# Patient Record
(demographics unavailable — no encounter records)

---

## 2024-11-12 NOTE — HISTORY OF PRESENT ILLNESS
[FreeTextEntry1] : HISTORY OF PRESENT ILLNESS ***Nov. 12, 2024*** New cardiologist Dr. Nettles Mr. CONNER was diagnosed with Diabetes Mellitus Type-2  7 or 8 YEARS AGO and arrives today for initial evaluation of glycemic control.   He reports history of HTN, dyslipidemia, IBS, Obesity, AFIB, vitamin D3 deficiency, Prostate CA s/p cyberknife, MI, CAD w/ stent placed 2022 known complications of retinopathy, nephropathy, or neuropathy. He is presently on metformin 500 mg 1 tab with breakfast 2 tabs with dinner  He endorses being highly motivated to reduce blood sugar lose weight to reduce renal and cardiac risk factors.   Blood sugars are not checked.   Hypoglycemia frequency: Pt denies subjective HYPOs   Fingerstick glucose in the office today is 150 mg/dL  3 hours after coffee with milk.   Diet: not following ADA,    Exercise: Denies structured exercise regimen, retired Lab review: a1c-6.2%   Last dilated eye -October 2024  Last podiatry visit  - every 2 months  Last cardiology evaluation - wears loop recorder, Dr. Nettles on december 2, 2024  Last stress test -  Last 2-D Echo -  Last nephrology evaluation -  Last neurology evaluation-

## 2024-11-12 NOTE — ASSESSMENT
[FreeTextEntry1] : T2DM, Obesity, in context of CAD Need more data at present -JUN PRO today and personal cgm moving forward as dietary change tool -Resume Ozempic 0.25mg qwk and up-titrate as directed -DC metformin for now -Dietary goal: 1 serving (15 grams) of Carbs per meal.   -Exercise: a brisk 10-minute walk 30-minutes after meals especially dinner Strength training-mild circuit training on resistance machines -We discussed the necessity that diet and exercise interventions be consistent daily quantifiable measures.    -Frequent follow ups  rtc 4 weeks    Diabetes Counseling: The patient was counseled on diabetes foot care, long term vascular complications of diabetes, carbohydrate consistent diet, importance of diet and exercise to improve glycemic control, achieve weight loss and improve cardiovascular health, exercise/effect on glucose, hypoglycemia management, glucagon use, ketone testing, action and use of short and long-acting insulin, self-monitoring of blood glucose, insulin self-administration, injection technique, storage, and sharps disposal and sick-day management.  Patient was referred to ophthalmology for retinopathy screening. Risks and benefits of diabetic medications were discussed.

## 2024-12-02 NOTE — HISTORY OF PRESENT ILLNESS
[FreeTextEntry1] : 72M with HTN, HLD, DM, pAF s/p ablation (), CAD s/p PCI to LAD (), KRISTINA on CPAP presents for initial cardiac evaluation   Here to establish care  Reports SOB when he climbs the stairs Can only walk 6-7 min before having to stop  Unchanged x years Sometimes feels a "pulsating" in his ears  Occasional chest "twinges" lasting a few seconds at a times No identifiable triggers, nonradiating, nonexertional   HISTORY:   - C/o palpitations, Afib noted on ECG. Sent to [facility] ER, underwent ablation with EP Dr. Ginny Reyez. Now with ILR.   - C/o chest tightness. Underwent cath at [facility]. S/p PCI to LAD. VF arrest post stent- s/p successful resuscitation   Former smoker, quit ~16yrs ago. Father -  of CHF, DM, CABG. Mother - HTN. Retired teacher.  ECG: SR with RBBB, [facility] 3/9/24: 80% mLAD s/p DECLAN TTE : LVEF 60%, mild DD, mild concentric LVH, dilated LA, dilated RA/RV, trivial AR, trace MR, trace TR Pacemaker interrogation 2024: multiple AF events lasting up to 1 hr.   Plavix 75mg  Eliquis 5mg BID  Rosuvastatin 20mg   Diltiazem 300mg  Losartan 50mg  Toprol 25mg   Ozempic 0.25mg qweekly

## 2024-12-02 NOTE — DISCUSSION/SUMMARY
[EKG obtained to assist in diagnosis and management of assessed problem(s)] : EKG obtained to assist in diagnosis and management of assessed problem(s) [FreeTextEntry1] : CAD: S/p DECLAN to mid LAD. No further CP. Ongoing SANCHEZ is chronic  Continue plavix 75mg Continue rosuvastatin 20mg. LDL 82  pAF: S/p ablation, now with recurrent episodes of AFib. Continue Eliquis, diltiazem, Toprol  HTN: Stable, continue meds. Stable CMP  HLD: Lipids good on statin. Target LDL should be lower, can consider increasing dose  DM: a1c 6.2%, continue current medication regimen   VF arrest: Post cath and PCI placement. Unclear etiology. Has ILR, no recurrent events   RV 4M

## 2024-12-13 NOTE — ASSESSMENT
[FreeTextEntry1] : T2DM, Obesity, in context of CAD -Continue Ozempic 0.5mg qwk, PA initiated -Continue metformin 500mg BID -Dietary goal: 1 serving (15 grams) of Carbs per meal.   -Exercise: a brisk 10-minute walk 30-minutes after meals especially dinner -Continue and increase Strength training-mild circuit training on resistance machines as tolerated -We discussed the necessity that diet and exercise interventions be consistent daily quantifiable measures.     RTC 2 months    Diabetes Counseling: The patient was counseled on diabetes foot care, long term vascular complications of diabetes, carbohydrate consistent diet, importance of diet and exercise to improve glycemic control, achieve weight loss and improve cardiovascular health, exercise/effect on glucose, hypoglycemia management, glucagon use, ketone testing, action and use of short and long-acting insulin, self-monitoring of blood glucose, insulin self-administration, injection technique, storage, and sharps disposal and sick-day management.  Patient was referred to ophthalmology for retinopathy screening. Risks and benefits of diabetic medications were discussed.

## 2024-12-13 NOTE — HISTORY OF PRESENT ILLNESS
[FreeTextEntry1] : ***Dec. 13, 2024*** Sebastien feels well overall denies new complaints he is presently on Ozempic 0.5mg qwk, metformin 500mg 1 tab bid, He has made excellent dietary changes and is exercising daily mild strength training.  He lost 8lbs in the last month and endorses feeling great. He did follow up with Cardiologist Dr. Nettles Date of download:  12/13/2024  Diabetes Medications and Dosage: as above  Indication for CGMS: verify a change in the treatment regimen, identify periods of hypoglycemia/ hyperglycemia.  Modal day report: pattern.  Pt with HYPO  1% of the time ( NONE below 54),  99% in target range  Hyperglycemia:  0% elevation  Identified issues: carbohydrate counting  dates analyzed: 11/29/2024 - 12/13/2024 He denies subjective HYPOs HISTORY OF PRESENT ILLNESS ***Nov. 12, 2024*** New cardiologist Dr. Nettles Mr. CONNER was diagnosed with Diabetes Mellitus Type-2  7 or 8 YEARS AGO and arrives today for initial evaluation of glycemic control.   He reports history of HTN, dyslipidemia, IBS, Obesity, AFIB, vitamin D3 deficiency, Prostate CA s/p cyberknife, MI, CAD w/ stent placed 2022 known complications of retinopathy, nephropathy, or neuropathy. He is presently on metformin 500 mg 1 tab with breakfast 2 tabs with dinner  He endorses being highly motivated to reduce blood sugar lose weight to reduce renal and cardiac risk factors.   Blood sugars are not checked.   Hypoglycemia frequency: Pt denies subjective HYPOs   Fingerstick glucose in the office today is 150 mg/dL  3 hours after coffee with milk.   Diet: not following ADA,    Exercise: Denies structured exercise regimen, retired Lab review: a1c-6.2%   Last dilated eye -October 2024  Last podiatry visit  - every 2 months  Last cardiology evaluation - wears loop recorder, Dr. Nettles on december 2, 2024  Last stress test -  Last 2-D Echo -  Last nephrology evaluation -  Last neurology evaluation-

## 2025-01-22 NOTE — DISEASE MANAGEMENT
[0-10] : 0 -10 ng/mL [Biopsy] : Patient had a biopsy on [7(3+4)] : Template Biopsy Pearce Score: 7(3+4) [] : Patient had a Prostate MRI [2] : 2 [BiopsyDate] : 1/31/2022 [MeasuredProstateVolume] : 53 [TotalCores] : 4 [TotalPositiveCores] : 12 [MaxCoreInvolvement] : 80 [IIC] : IIC [Active Surveillance] : Active Surveillance [Radiation Therapy] : Migrate  from Active Surveillance  to Radiation Therapy [Treatment with radiation therapy] : Treatment with radiation therapy [EBRT] : EBRT [Treatment with androgen ablation] : Treatment with androgen ablation [LastPSADate] : 1/3/2024 [LastPSAResults] : 4.76 ng/mL [LastIDate] : 1/5/2024 [LastMRIResults] : Right posterior lateral peripheral zone lesion (PZpl) at the apex. PIRADS 4 [FirstSurveillanceBiopsyDate] : 8/8/2022 [FirstSurveillanceBiopsyResults] : see HPI for details  [2SurveillanceBiopsyDate] : 3/14/2024 [2SurveillanceBiopsyResults] : see HPI for details  [RadiationCompletedDate] : 6/21/2024 [EBRTDose] : 4000cGy [EBRTFractions] : 5 [ADTStartedDate] : 5/10/2024 [ADTDuration] : plan for 6 months  [Pathological] : TNM Stage: p [TTNM] : 2 [NTNM] : 0 [MTNM] : 0

## 2025-01-22 NOTE — HISTORY OF PRESENT ILLNESS
[FreeTextEntry1] : Mr. Lu is a 72-year-old male who presents for a follow up visit.   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 involvement.  He was on active surveillance and is now for definitive treatment.   PSMA PET with no metastatic disease identified.    TREATMENT: 5/10/24 - received Eligard injection (22.5 mg) with Dr. Norris (urology), second injection given on 8/5/24.  6/6/24 - 6/21/24: Received RADIATION Therapy to Prostate/SV, 4000 cGy in 5 fx with ADT (plan for 6 months)  PSA Trend: 12/12/21 - 8.36 ng/mL 4/1/22 - 4.77 6/12/22 - 6.46 7/10/22 - 7.54 *started on Finasteride*  10/13/22 - 3.26 1/20/23 - 3.22 4/5/23 - 3.54 9/25/23 - 3.31 1/3/24 - 4.76  5/10/24 - 1.38           Testosterone 363.0 ng/dL *on Bicalutamide*  7/17/24 - 0.04 *post treatment*  1/3/25 - < 0.01   HPI:  12/2021 - PSA elevated to 8.36 ng/mL.  12/21/21 - MRI Prostate:  Prostate volume 53 cc. No MRI targetable prostate lesions. Diffuse, avid inflammatory type enhancement throughout the bilateral peripheral zone. PIRADS 2 Mild nodular hyperplasia of the transition zone for an overall gland volume of 53 cc. PSA density: 0.14 ng/mL/cc.   1/31/22 - underwent Prostate Biopsy: Pathology - Adenocarcinoma of Prostate in 4/12 sites, Linda score 3+4=7 in 1 site and Beattie score 3+3=6 in 3 sites, perineural invasion identified in one positive core.  80% max. involvement.   Decipher Score: 0.24 (Low Risk)   2/14/22 - saw Dr. Norris (urology) in follow up .... discussed biopsy results.  Decision made for active surveillance.    8/8/22 - underwent Confirmatory Prostate Biopsy:  Pathology - Adenocarcinoma of Prostate in 3/12 sites. Beattie score 3+4=7 in all 3 sites.  40% max involvement.    9/8/22 - met with Dr. Cedeño (urology surgeon) and Dr. Bray (radiation oncology) to discuss treatment options.  Mr. Lu decided to continue on active surveillance.   1/5/24 - MRI Prostate: Prostate volume 29 cc (previously 53 cc).  Lesion #1, Right, posterior lateral (PZpl), apex, peripheral zone (5 x 5 x 5 mm). Tumor abuts but does not deform capsule. No extraprostatic extension, seminal vesicle invasion, or pelvic lymphadenopathy. PIRADS 4   2/15/24 - saw Dr. Norris (urology) in follow up, discussed MRI results and concern regarding his PSA density, and the rise in PSA while on finasteride. To have biopsy.   3/14/24 - underwent Prostate Biopsy: Pathology - Adenocarcinoma of Prostate in 7/13 sites. Linda score 4+3=7 in 1 site. Linda score 3+4=7 in 5 sites (including MRI target lesion) and Linda score 3+3=6 in 1 site. 70% max. involvement.    3/25/24 - saw Dr. Cedeño (urology surgeon) in consultation ...... discussed surgical option for treatment.  Referral also made to radiation oncology for consult. PSMA PET ordered.   4/2/24 - PSMA PET: 1. PSMA avid lesions are noted in the prostate gland as detailed in full report. 2. No evidence of regional or distant PSMA avid lesions. 3. Nonspecific ill-defined groundglass opacity is noted in the lingula. Correlation with a follow-up diagnostic chest CT in 3 months to ensure stability/resolution is recommended. 4.  Skin focus is noted in the inferior right arm pit which may be infectious/inflammatory in nature. Correlation with clinical exam. 5.   Distinct tiny less than 5 mm focus is noted in the right testes' region of uncertain clinical significance differential includes inflammatory etiologies as well as contamination. Further correlation with an ultrasound may be of additional value as clinically indicated.  4/16/24 - presented in consultation for discussion of radiation therapy options.  Mr. Lu is feeling well today and looks forward to next steps in his treatment. He does have fatigue. His urinary symptoms are frequency (hourly during the day), nocturia of 1x, urgency, a bit of weak stream, no hematuria.  He is on Finasteride and Tamsulosin.  He has a history of IBS and diverticulosis; last colonoscopy was in 2023. IPSS 15 / QOL 4 / EPIC-CP 22 Discussed the results of his imaging and pathology to date. Recommended treatment, as opposed to surveillance, for his disease. He has seen Dr Cedeño to discuss surgery but there is some anxiety regarding his comorbidities. Today we discussed treating with radiation. Since he does not need pelvic radiation options include SBRT or moderate hypofractionation to the prostate. He is aware there is a brisker side effect profile associated with SBRT. SBRT would be his preference. We have discussed the use of 6 months of androgen deprivation treatment as well as the use of the SpaceOAR and fiducial markers to protect the rectum and guide daily treatment. We have also discussed the logistics and common and rare acute and late side effects from prostate radiation in detail. He had a colonoscopy in 2023. He has had written information to take away today. He has signed consent and will start bicalutamide. He will follow up with Dr Norris for ADT and will let me know when he has had his first shot.  5/10/24 - Received Eligard injection (22.5 mg) with Dr. Norris (urology)  6/6/24 - 6/21/24: Received RADIATION Therapy to Prostate/SV, 4000 cGy in 5 fx with ADT (plan for 6 months) Had increase in urinary urgency, dysuria, and some incontinence after first couple treatments. Started on Tamsulosin and given Pyridium.    7/17/24 - presented for post treatment evaluation appointment. Mr. Lu is doing well today.  His symptoms have continued to improve since completing the radiation therapy.  He still has urinary frequency, nocturia 2x, urgency, and has some discomfort "squeezing" when urinates, no burning.  Denies hematuria. Remains on Tamsulosin 2 capsules.  No bowel issues.  Poor erectile function, told by cardiologist not to use Cialis.  IPSS 18 / QOL 5 / EPIC-CP 38 He is to see urology on 8/5/24 for his next 3-month ADT injection.  PSA drawn today.   Urinary symptoms have improved. FU 6m.     8/5/24 - saw Dr. Norris (urology) in follow up. c/o slow stream, urgency/frequency (He voids about every 30 mins.), low voiding output, and feeling of incomplete voiding. Also, with occasional urge incontinence for which he uses 1 safety pad during the day. This has gotten a little bit worse since radiation. Given an antibiotic. To continue on Tamsulosin and Finasteride. Also, will try Tolterodine daily.  Follow up 6-7 weeks. Also received Eligard injection.  9/4/24 - saw Dr. Norris (urology) for cystoscopy due to complaints of onset of gross hematuria that started on 8/7/24. Cystoscopy found enlarged prostate, causing outlet obstruction. Clear b/l jets visualized. Left partially obstructed. No strictures. No tumors.  Gross hematuria likely from stone based on work-up. Hope for spontaneous passage as stone is small (only 3x4 mm). On 9/1/23 CT AP revealed Rt 3x4 mm distal ureteral stone, and mild right ureteral dilation. 9/18/24 - saw Dr. Norris (urology) in follow up. -MARIAH today shows B/l parapelvic cysts: Rt Cyst measuring 1.7cm x 1.2 cm x 1.0 cm, left cyst measuring 3.9 cm x 2.6 cm x 2.3 cm. There is a cyst with a septation seen in the lower pole of the left kidney measuring 1.8 cm x 1.7 cm x 1.7 cm with no flow. Both kidneys are normal in size and echogenicity without stones, hydronephrosis or solid masses visualized. MARIAH shows patient is with resolved Rt hydro that was evident on 9/1/23 CT AP w IV Cont. Explained to patient it's possible he may have spontaneously passed a kidney stone. Pt will have a CT Urogram to complete Genevieve hematuria work-up.  10/1/24 - CT A/P Urogram: Patient has left parapelvic renal cysts effacing a nondilated renal pelvis with infiltrative changes seen along the posterior inferior margin of the renal hilum of unclear etiology. No definable mass can be identified. No definitive evidence of uroepithelial enhancement can be seen. This is of unclear etiology and not seen on the prior CT dated 8/24 PET/CT dated 4/24. Infraumbilical midline abdominal wall hernia containing a small knuckle bowel.   10/11/24 - US Kidney: 1.7 cm cyst with thin septation in the lower pole of the left kidney.  1/31/25 - presents for follow up appointment.  Continues to follow with Dr. Norris (urology) next appointment in April.

## 2025-01-31 NOTE — HISTORY OF PRESENT ILLNESS
[FreeTextEntry1] : Mr. Lu is a 72-year-old male who presents for a follow up visit.   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 involvement.  He was on active surveillance and is now for definitive treatment.   PSMA PET with no metastatic disease identified.    TREATMENT: 5/10/24 - received Eligard injection (22.5 mg) with Dr. Norris (urology), second injection given on 8/5/24.  6/6/24 - 6/21/24: Received RADIATION Therapy to Prostate/SV, 4000 cGy in 5 fx with ADT (plan for 6 months)  PSA Trend: 12/12/21 - 8.36 ng/mL 4/1/22 - 4.77 6/12/22 - 6.46 7/10/22 - 7.54 *started on Finasteride*  10/13/22 - 3.26 1/20/23 - 3.22 4/5/23 - 3.54 9/25/23 - 3.31 1/3/24 - 4.76  5/10/24 - 1.38           Testosterone 363.0 ng/dL *on Bicalutamide*  7/17/24 - 0.04 *post treatment*  1/3/25 - < 0.01   HPI:  12/2021 - PSA elevated to 8.36 ng/mL.  12/21/21 - MRI Prostate:  Prostate volume 53 cc. No MRI targetable prostate lesions. Diffuse, avid inflammatory type enhancement throughout the bilateral peripheral zone. PIRADS 2 Mild nodular hyperplasia of the transition zone for an overall gland volume of 53 cc. PSA density: 0.14 ng/mL/cc.   1/31/22 - underwent Prostate Biopsy: Pathology - Adenocarcinoma of Prostate in 4/12 sites, Linda score 3+4=7 in 1 site and [place] score 3+3=6 in 3 sites, perineural invasion identified in one positive core.  80% max. involvement.   Decipher Score: 0.24 (Low Risk)   2/14/22 - saw Dr. Norris (urology) in follow up .... discussed biopsy results.  Decision made for active surveillance.    8/8/22 - underwent Confirmatory Prostate Biopsy:  Pathology - Adenocarcinoma of Prostate in 3/12 sites. [place] score 3+4=7 in all 3 sites.  40% max involvement.    9/8/22 - met with Dr. Cedeño (urology surgeon) and Dr. Bray (radiation oncology) to discuss treatment options.  Mr. Lu decided to continue on active surveillance.   1/5/24 - MRI Prostate: Prostate volume 29 cc (previously 53 cc).  Lesion #1, Right, posterior lateral (PZpl), apex, peripheral zone (5 x 5 x 5 mm). Tumor abuts but does not deform capsule. No extraprostatic extension, seminal vesicle invasion, or pelvic lymphadenopathy. PIRADS 4   2/15/24 - saw Dr. Norris (urology) in follow up, discussed MRI results and concern regarding his PSA density, and the rise in PSA while on finasteride. To have biopsy.   3/14/24 - underwent Prostate Biopsy: Pathology - Adenocarcinoma of Prostate in 7/13 sites. Linda score 4+3=7 in 1 site. Linda score 3+4=7 in 5 sites (including MRI target lesion) and Linda score 3+3=6 in 1 site. 70% max. involvement.    3/25/24 - saw Dr. Cedeño (urology surgeon) in consultation ...... discussed surgical option for treatment.  Referral also made to radiation oncology for consult. PSMA PET ordered.   4/2/24 - PSMA PET: 1. PSMA avid lesions are noted in the prostate gland as detailed in full report. 2. No evidence of regional or distant PSMA avid lesions. 3. Nonspecific ill-defined groundglass opacity is noted in the lingula. Correlation with a follow-up diagnostic chest CT in 3 months to ensure stability/resolution is recommended. 4.  Skin focus is noted in the inferior right arm pit which may be infectious/inflammatory in nature. Correlation with clinical exam. 5.   Distinct tiny less than 5 mm focus is noted in the right testes' region of uncertain clinical significance differential includes inflammatory etiologies as well as contamination. Further correlation with an ultrasound may be of additional value as clinically indicated.  4/16/24 - presented in consultation for discussion of radiation therapy options.  Mr. Lu is feeling well today and looks forward to next steps in his treatment. He does have fatigue. His urinary symptoms are frequency (hourly during the day), nocturia of 1x, urgency, a bit of weak stream, no hematuria.  He is on Finasteride and Tamsulosin.  He has a history of IBS and diverticulosis; last colonoscopy was in 2023. IPSS 15 / QOL 4 / EPIC-CP 22 Discussed the results of his imaging and pathology to date. Recommended treatment, as opposed to surveillance, for his disease. He has seen Dr Cedeño to discuss surgery but there is some anxiety regarding his comorbidities. Today we discussed treating with radiation. Since he does not need pelvic radiation options include SBRT or moderate hypofractionation to the prostate. He is aware there is a brisker side effect profile associated with SBRT. SBRT would be his preference. We have discussed the use of 6 months of androgen deprivation treatment as well as the use of the SpaceOAR and fiducial markers to protect the rectum and guide daily treatment. We have also discussed the logistics and common and rare acute and late side effects from prostate radiation in detail. He had a colonoscopy in 2023. He has had written information to take away today. He has signed consent and will start bicalutamide. He will follow up with Dr Norris for ADT and will let me know when he has had his first shot.  5/10/24 - Received Eligard injection (22.5 mg) with Dr. Norris (urology)  6/6/24 - 6/21/24: Received RADIATION Therapy to Prostate/SV, 4000 cGy in 5 fx with ADT (plan for 6 months) Had increase in urinary urgency, dysuria, and some incontinence after first couple treatments. Started on Tamsulosin and given Pyridium.    7/17/24 - presented for post treatment evaluation appointment. Mr. Lu is doing well today.  His symptoms have continued to improve since completing the radiation therapy.  He still has urinary frequency, nocturia 2x, urgency, and has some discomfort "squeezing" when urinates, no burning.  Denies hematuria. Remains on Tamsulosin 2 capsules.  No bowel issues.  Poor erectile function, told by cardiologist not to use Cialis.  IPSS 18 / QOL 5 / EPIC-CP 38 He is to see urology on 8/5/24 for his next 3-month ADT injection.  PSA drawn today.   Urinary symptoms have improved. FU 6m.     8/5/24 - saw Dr. Norris (urology) in follow up. c/o slow stream, urgency/frequency (He voids about every 30 mins.), low voiding output, and feeling of incomplete voiding. Also, with occasional urge incontinence for which he uses 1 safety pad during the day. This has gotten a little bit worse since radiation. Given an antibiotic. To continue on Tamsulosin and Finasteride. Also, will try Tolterodine daily.  Follow up 6-7 weeks. Also received Eligard injection.  9/4/24 - saw Dr. Norris (urology) for cystoscopy due to complaints of onset of gross hematuria that started on 8/7/24. Cystoscopy found enlarged prostate, causing outlet obstruction. Clear b/l jets visualized. Left partially obstructed. No strictures. No tumors.  Gross hematuria likely from stone based on work-up. Hope for spontaneous passage as stone is small (only 3x4 mm). On 9/1/23 CT AP revealed Rt 3x4 mm distal ureteral stone, and mild right ureteral dilation. 9/18/24 - saw Dr. Norris (urology) in follow up. -MARIAH today shows B/l parapelvic cysts: Rt Cyst measuring 1.7cm x 1.2 cm x 1.0 cm, left cyst measuring 3.9 cm x 2.6 cm x 2.3 cm. There is a cyst with a septation seen in the lower pole of the left kidney measuring 1.8 cm x 1.7 cm x 1.7 cm with no flow. Both kidneys are normal in size and echogenicity without stones, hydronephrosis or solid masses visualized. MARIAH shows patient is with resolved Rt hydro that was evident on 9/1/23 CT AP w IV Cont. Explained to patient it's possible he may have spontaneously passed a kidney stone. Pt will have a CT Urogram to complete Genevieve hematuria work-up.  10/1/24 - CT A/P Urogram: Patient has left parapelvic renal cysts effacing a nondilated renal pelvis with infiltrative changes seen along the posterior inferior margin of the renal hilum of unclear etiology. No definable mass can be identified. No definitive evidence of uroepithelial enhancement can be seen. This is of unclear etiology and not seen on the prior CT dated 8/24 PET/CT dated 4/24. Infraumbilical midline abdominal wall hernia containing a small knuckle bowel.   10/11/24 - US Kidney: 1.7 cm cyst with thin septation in the lower pole of the left kidney.  1/31/25 - presents for follow up appointment.  Continues to follow with Dr. Norris (urology) next appointment in April.

## 2025-01-31 NOTE — DISEASE MANAGEMENT
[BiopsyDate] : 1/31/2022 [MeasuredProstateVolume] : 53 [TotalCores] : 4 [TotalPositiveCores] : 12 [MaxCoreInvolvement] : 80 [LastPSADate] : 1/3/2024 [LastPSAResults] : 4.76 ng/mL [LastIDate] : 1/5/2024 [LastMRIResults] : Right posterior lateral peripheral zone lesion (PZpl) at the apex. PIRADS 4 [FirstSurveillanceBiopsyDate] : 8/8/2022 [FirstSurveillanceBiopsyResults] : see HPI for details  [2SurveillanceBiopsyDate] : 3/14/2024 [2SurveillanceBiopsyResults] : see HPI for details  [RadiationCompletedDate] : 6/21/2024 [EBRTDose] : 4000cGy [EBRTFractions] : 5 [ADTStartedDate] : 5/10/2024 [ADTDuration] : plan for 6 months  [TTNM] : 2 [NTNM] : 0 [MTNM] : 0

## 2025-01-31 NOTE — REVIEW OF SYSTEMS
[Anal Pain: Grade 0] : Anal Pain: Grade 0 [Constipation: Grade 0] : Constipation: Grade 0 [Diarrhea: Grade 0] : Diarrhea: Grade 0 [Fecal Incontinence: Grade 0] : Fecal Incontinence: Grade 0 [Hematuria: Grade 0] : Hematuria: Grade 0 [Urinary Incontinence: Grade 0] : Urinary Incontinence: Grade 0  [Urinary Tract Pain: Grade 0] : Urinary Tract Pain: Grade 0 [Urinary Frequency: Grade 0] : Urinary Frequency: Grade 0

## 2025-01-31 NOTE — HISTORY OF PRESENT ILLNESS
[FreeTextEntry1] : Mr. Lu is a 72-year-old male who presents for a follow up visit.   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 involvement.  He was on active surveillance and is now for definitive treatment.   PSMA PET with no metastatic disease identified.    TREATMENT: 5/10/24 - received Eligard injection (22.5 mg) with Dr. Norris (urology), second injection given on 8/5/24.  6/6/24 - 6/21/24: Received RADIATION Therapy to Prostate/SV, 4000 cGy in 5 fx with ADT (plan for 6 months)  PSA Trend: 12/12/21 - 8.36 ng/mL 4/1/22 - 4.77 6/12/22 - 6.46 7/10/22 - 7.54 *started on Finasteride*  10/13/22 - 3.26 1/20/23 - 3.22 4/5/23 - 3.54 9/25/23 - 3.31 1/3/24 - 4.76  5/10/24 - 1.38           Testosterone 363.0 ng/dL *on Bicalutamide*  7/17/24 - 0.04 *post treatment*  1/3/25 - < 0.01   HPI:  12/2021 - PSA elevated to 8.36 ng/mL.  12/21/21 - MRI Prostate:  Prostate volume 53 cc. No MRI targetable prostate lesions. Diffuse, avid inflammatory type enhancement throughout the bilateral peripheral zone. PIRADS 2 Mild nodular hyperplasia of the transition zone for an overall gland volume of 53 cc. PSA density: 0.14 ng/mL/cc.   1/31/22 - underwent Prostate Biopsy: Pathology - Adenocarcinoma of Prostate in 4/12 sites, Linda score 3+4=7 in 1 site and Rossiter score 3+3=6 in 3 sites, perineural invasion identified in one positive core.  80% max. involvement.   Decipher Score: 0.24 (Low Risk)   2/14/22 - saw Dr. Norris (urology) in follow up .... discussed biopsy results.  Decision made for active surveillance.    8/8/22 - underwent Confirmatory Prostate Biopsy:  Pathology - Adenocarcinoma of Prostate in 3/12 sites. Rossiter score 3+4=7 in all 3 sites.  40% max involvement.    9/8/22 - met with Dr. Cedeño (urology surgeon) and Dr. Bray (radiation oncology) to discuss treatment options.  Mr. Lu decided to continue on active surveillance.   1/5/24 - MRI Prostate: Prostate volume 29 cc (previously 53 cc).  Lesion #1, Right, posterior lateral (PZpl), apex, peripheral zone (5 x 5 x 5 mm). Tumor abuts but does not deform capsule. No extraprostatic extension, seminal vesicle invasion, or pelvic lymphadenopathy. PIRADS 4   2/15/24 - saw Dr. Norris (urology) in follow up, discussed MRI results and concern regarding his PSA density, and the rise in PSA while on finasteride. To have biopsy.   3/14/24 - underwent Prostate Biopsy: Pathology - Adenocarcinoma of Prostate in 7/13 sites. Linda score 4+3=7 in 1 site. Linda score 3+4=7 in 5 sites (including MRI target lesion) and Linda score 3+3=6 in 1 site. 70% max. involvement.    3/25/24 - saw Dr. Cedeño (urology surgeon) in consultation ...... discussed surgical option for treatment.  Referral also made to radiation oncology for consult. PSMA PET ordered.   4/2/24 - PSMA PET: 1. PSMA avid lesions are noted in the prostate gland as detailed in full report. 2. No evidence of regional or distant PSMA avid lesions. 3. Nonspecific ill-defined groundglass opacity is noted in the lingula. Correlation with a follow-up diagnostic chest CT in 3 months to ensure stability/resolution is recommended. 4.  Skin focus is noted in the inferior right arm pit which may be infectious/inflammatory in nature. Correlation with clinical exam. 5.   Distinct tiny less than 5 mm focus is noted in the right testes' region of uncertain clinical significance differential includes inflammatory etiologies as well as contamination. Further correlation with an ultrasound may be of additional value as clinically indicated.  4/16/24 - presented in consultation for discussion of radiation therapy options.  Mr. Lu is feeling well today and looks forward to next steps in his treatment. He does have fatigue. His urinary symptoms are frequency (hourly during the day), nocturia of 1x, urgency, a bit of weak stream, no hematuria.  He is on Finasteride and Tamsulosin.  He has a history of IBS and diverticulosis; last colonoscopy was in 2023. IPSS 15 / QOL 4 / EPIC-CP 22 Discussed the results of his imaging and pathology to date. Recommended treatment, as opposed to surveillance, for his disease. He has seen Dr Cedeño to discuss surgery but there is some anxiety regarding his comorbidities. Today we discussed treating with radiation. Since he does not need pelvic radiation options include SBRT or moderate hypofractionation to the prostate. He is aware there is a brisker side effect profile associated with SBRT. SBRT would be his preference. We have discussed the use of 6 months of androgen deprivation treatment as well as the use of the SpaceOAR and fiducial markers to protect the rectum and guide daily treatment. We have also discussed the logistics and common and rare acute and late side effects from prostate radiation in detail. He had a colonoscopy in 2023. He has had written information to take away today. He has signed consent and will start bicalutamide. He will follow up with Dr Norris for ADT and will let me know when he has had his first shot.  5/10/24 - Received Eligard injection (22.5 mg) with Dr. Norris (urology)  6/6/24 - 6/21/24: Received RADIATION Therapy to Prostate/SV, 4000 cGy in 5 fx with ADT (plan for 6 months) Had increase in urinary urgency, dysuria, and some incontinence after first couple treatments. Started on Tamsulosin and given Pyridium.    7/17/24 - presented for post treatment evaluation appointment. Mr. Lu is doing well today.  His symptoms have continued to improve since completing the radiation therapy.  He still has urinary frequency, nocturia 2x, urgency, and has some discomfort "squeezing" when urinates, no burning.  Denies hematuria. Remains on Tamsulosin 2 capsules.  No bowel issues.  Poor erectile function, told by cardiologist not to use Cialis.  IPSS 18 / QOL 5 / EPIC-CP 38 He is to see urology on 8/5/24 for his next 3-month ADT injection.  PSA drawn today.   Urinary symptoms have improved. FU 6m.     8/5/24 - saw Dr. Norris (urology) in follow up. c/o slow stream, urgency/frequency (He voids about every 30 mins.), low voiding output, and feeling of incomplete voiding. Also, with occasional urge incontinence for which he uses 1 safety pad during the day. This has gotten a little bit worse since radiation. Given an antibiotic. To continue on Tamsulosin and Finasteride. Also, will try Tolterodine daily.  Follow up 6-7 weeks. Also received Eligard injection.  9/4/24 - saw Dr. Norris (urology) for cystoscopy due to complaints of onset of gross hematuria that started on 8/7/24. Cystoscopy found enlarged prostate, causing outlet obstruction. Clear b/l jets visualized. Left partially obstructed. No strictures. No tumors.  Gross hematuria likely from stone based on work-up. Hope for spontaneous passage as stone is small (only 3x4 mm). On 9/1/23 CT AP revealed Rt 3x4 mm distal ureteral stone, and mild right ureteral dilation. 9/18/24 - saw Dr. Norris (urology) in follow up. -MARIAH today shows B/l parapelvic cysts: Rt Cyst measuring 1.7cm x 1.2 cm x 1.0 cm, left cyst measuring 3.9 cm x 2.6 cm x 2.3 cm. There is a cyst with a septation seen in the lower pole of the left kidney measuring 1.8 cm x 1.7 cm x 1.7 cm with no flow. Both kidneys are normal in size and echogenicity without stones, hydronephrosis or solid masses visualized. MARIAH shows patient is with resolved Rt hydro that was evident on 9/1/23 CT AP w IV Cont. Explained to patient it's possible he may have spontaneously passed a kidney stone. Pt will have a CT Urogram to complete Genevieve hematuria work-up.  10/1/24 - CT A/P Urogram: Patient has left parapelvic renal cysts effacing a nondilated renal pelvis with infiltrative changes seen along the posterior inferior margin of the renal hilum of unclear etiology. No definable mass can be identified. No definitive evidence of uroepithelial enhancement can be seen. This is of unclear etiology and not seen on the prior CT dated 8/24 PET/CT dated 4/24. Infraumbilical midline abdominal wall hernia containing a small knuckle bowel.   10/11/24 - US Kidney: 1.7 cm cyst with thin septation in the lower pole of the left kidney.  1/31/25 - presents for follow up appointment.  Continues to follow with Dr. Norris (urology) next appointment in April.

## 2025-01-31 NOTE — VITALS
[Maximal Pain Intensity: 3/10] : 3/10 [Pain Location: ___] : Pain Location: [unfilled] [Pain Interferes with ADLs] : Pain interferes with activities of daily living. [OTC] : OTC

## 2025-02-18 NOTE — ASSESSMENT
[FreeTextEntry1] : T2DM, Obesity, in context of CAD -Continue Ozempic 0.5mg qwk, sample provided -Continue metformin 500mg BID -Dietary goal: 1 serving (15 grams) of Carbs per meal.   -Exercise: a brisk 10-minute walk 30-minutes after meals especially dinner -Continue and increase Strength training-mild circuit training on resistance machines as tolerated -We discussed the necessity that diet and exercise interventions be consistent daily quantifiable measures.    names of alternative meds provided to be priced out, oz   RTC  2-3 months    Diabetes Counseling: The patient was counseled on diabetes foot care, long term vascular complications of diabetes, carbohydrate consistent diet, importance of diet and exercise to improve glycemic control, achieve weight loss and improve cardiovascular health, exercise/effect on glucose, hypoglycemia management, glucagon use, ketone testing, action and use of short and long-acting insulin, self-monitoring of blood glucose, insulin self-administration, injection technique, storage, and sharps disposal and sick-day management.  Patient was referred to ophthalmology for retinopathy screening. Risks and benefits of diabetic medications were discussed.
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
## 2025-04-01 NOTE — DISCUSSION/SUMMARY
[FreeTextEntry1] : CAD: S/p DECLAN to mid LAD. No further CP. Notes some SANCHEZ but seems more chronic  Now 1+ year post PCI, d/c Plavix and start asa 81mg Continue rosuvastatin 20mg. LDL 72 Repeat TTE  pAF: S/p ablation, recurrent Afib but no episodes last few months. Continue Eliquis, diltiazem, Toprol  HTN: Stable, continue meds. Stable CMP  HLD: Lipids good on statin. 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 Unclear etiology. Has ILR, no recurrent events   RV 3M

## 2025-04-01 NOTE — HISTORY OF PRESENT ILLNESS
[FreeTextEntry1] : 72M with HTN, HLD, DM, pAF s/p ablation (), CAD s/p PCI to LAD (), KRISTINA on CPAP   Presents for follow up Recent fall down his last 2 steps, cut his ear, went to [facility] with negative imaging He notes that he gets winded walking down his block over the last few months, he notes being more inactive over the winter Similar complaints at last visit in 2024 Denies CP   Not tolerating Ozempic, no weight loss, planning on stopping soon  HISTORY:   - C/o palpitations, Afib noted on ECG. Sent to [facility] ER, underwent ablation with EP Dr. Ginny Reyez. Now with ILR.   - C/o chest tightness. Underwent cath at [facility]. S/p PCI to LAD. VF arrest post stent- s/p successful resuscitation   Former smoker, quit ~16yrs ago. Father -  of CHF, DM, CABG. Mother - HTN. Retired teacher.  ECG: SR with RBBB, LAFB [facility] 3/9/24: 80% mLAD s/p DECLAN TTE : LVEF 60%, mild DD, mild concentric LVH, dilated LA, dilated RA/RV, trivial AR, trace MR, trace TR ILR 3/2025: No real Afib last 3 months   Plavix 75mg (switch to asa 81mg)  Eliquis 5mg BID  Rosuvastatin 20mg   Diltiazem 300mg  Losartan 50mg  Toprol 25mg   Ozempic 0.25mg qweekly (will be stopping shortly)

## 2025-04-26 NOTE — HISTORY OF PRESENT ILLNESS
[FreeTextEntry1] : Chief complaint: flare of inflammatory bowel disease flare of gastritis   HPI: Patient presents for Gastroenterology evaluation because of evaluation and management of multiple complex Gastrointestinal signs and symptoms. Has complex syndrome of abdominal pain with intermittent crampy rlq and llq abdominal pain, there were no exacerbating or ameliorating factors, the abdominal pain is non radiating, associated factors include hematochezia.   Patient presents also for acute flare of gastritis with epigastric abdominal pain, dyspepsia, indigestion and odynophagia.   New prescription sent to pharmacy for famotidine. New prescription sent to pharmacy for pantoprazole.  New prescription sent to pharmacy for cephalexin. New prescription sent to the pharmacy for enulose  AS part of this complex Gastroenterology evaluation, i extensively reviewed prior medical records on the patient including discharge summary from recent hospitalization, operative report from recent operative report, as well as recent blood work.

## 2025-04-26 NOTE — ASSESSMENT
[FreeTextEntry1] : DIAGNOSIS/IMPRESSION  ACUTE FLARE OF INFLAMMATORY BOWEL DISESAE HEMATOCHEZIA MELENA ACUTE FLARE OF GASTRITIS LLQ ABDOMINAL PAIN  PLAN NEW PRESCRIPTION FOR CEPHALEXIN SENT TO PHARMACY NEW PRESCRIPTION FOR FAMOTIDINE SENT TO PHARMACY  NEW PRESCRITION FOR PANTOPRAZOLE SENT TO PHARMACY NEW PRESCRIPTION FOR ENULOSE SENT TO PHARMACY FOLLOWUP APPOINTMENT SCHEDULED FOR PATIENT, BECAUSE HIS MEDICATIONS WILL NEED TO BE TITRATED BASED ON HIS CLINICAL STATUS.

## 2025-05-01 NOTE — CARDIOLOGY SUMMARY
[de-identified] :  2/7/2020, IMPRESSIONS:Normal Study* Myocardial Perfusion SPECT results are normal.* Review of raw data shows: The study is of good technicalquality.* The left ventricle was normal in size. Normal myocardialperfusion scan,with no evidence of infarction or inducibleischemia.* Post-stress gated wall motion analysis was performed(LVEF = 64 %;LVEDV = 82 ml.), revealing normal LVfunction. RV function appeared normal.  [de-identified] : 4/2025: LVEF 65% ([facility]) 3/13/2023: Conclusions:  1. Aortic valve not well visualized; appears calcified. Peak transaortic valve gradient equals 13 mm Hg, mean transaortic valve gradient equals 6 mm Hg, estimated aortic valve area equals 1.8 sqcm (by continuity equation), aortic valve velocity time integral equals 33 cm, consistent with mild aortic stenosis. Minimal aortic regurgitation.  2. Normal left ventricular systolic function. No segmental wall motion abnormalities. Endocardial visualization enhanced with intravenous injection of Ultrasonic Enhancing Agent (Definity). 3. The right ventricle is not well visualized; grossly mildly reduced  right ventricular systolic function. TAPSE 1.46  2/6/2020, CONCLUSIONS:1. Calcified trileaflet aortic valve with normal opening.2. Normal left ventricular internal dimensions and wallthicknesses.3. Normal left ventricular systolic function. No segmentalwall motion abnormalities.4. Normal right ventricular size and function. LVEF 71%.  [de-identified] : 3/9/2023: \par  Interventional Details \par  Mid left anterior descending: The initial stenosis was 80 %. This was\par  an ACC/AHA Non-High/Non C lesion for intervention.\par  Guidewire crossing was successful.  \par  \par  A successful Drug Eluting Stent was deployed using a 6FR JL 3.5\par  LAUNCHER, a BMW UNIVERSAL 190, and a 2.75 X 16\par  SYNERGY XD.  \par    The inflation pressure was 20 lesley for the duration of 16.0 seconds. \par  \par  Following intervention there is a 1 % residual stenosis. There was\par  LIZY Flow 3 before the procedure and LIZY Flow 3 following the\par  procedure. Following intervention there is an excellent angiographic\par  appearance and no evidence of thrombus.\par

## 2025-05-01 NOTE — HISTORY OF PRESENT ILLNESS
[FreeTextEntry1] : Sebastien Lu is a 71y/o man with Hx of HTN, HLD, DMII on metformin, all of which are stable, KRISTINA on CPAP, and persistent atrial fibrillation s/p PVI, PW, and CTI ablation and ILR placement on 7/7/2020 and replacement on 2023 and CAD s/p PCI placement (3/9/2023) c/b VF arrest who presents today for routine f/u. On his routine MDT ILR review showed multiple AF events, longest lasting ~ 6 hours 50 minutes on 4/24/25 and some with RVR with rate 207 bpm longest lasting ~ 22 minutes. AF burden 2.1%, PVC burden 0.5%. He reports first episode occurred after his knee surgery and second episode after exertion. Overall feels well. Recent echo from past month with LVEF 65%. Denies palpitations, SOB, syncope or near syncope.  On prior visit, he had been hospitalized for [facility] and underwent placement of stent to 80% LAD. 20 min in recovery he had a VF arrest with ROSC and eventual discharge home with LifeVest on. Had an ECHO at time with normal LVEF. Has been doing well since that time but did have two episodes of chest discomfort with exertion, similar to how he felt prior to stents. No chest pain at present. Denies radiation to arm or back.

## 2025-05-01 NOTE — DISCUSSION/SUMMARY
[EKG obtained to assist in diagnosis and management of assessed problem(s)] : EKG obtained to assist in diagnosis and management of assessed problem(s) [FreeTextEntry1] : Impression:  1.Paroxysmal afib: s/p PVI, PW, and CTI ablation and ILR placement on 7/7/2020. EKG performed today to assess for afib recurrence and reveals NSR. ILR with two new episodes of afib from 04/2025 burden 2.1%. Triggered by knee surgery. Discussed treatment options for afib including rate control vs antiarrhythmics vs possible ablation. Given recurrent symptomatic afib despite rate control management and preference to avoid antiarrhythmics, recommend undergoing possible afib ablation. Resume diltiazem and Eliquis as prescribed. Resume routine ILR monitoring as scheduled.   2. HTN: resume oral antihypertensives as prescribed. Encouraged heart healthy diet, sodium restriction, and weight loss. Continue regular f/u with Cardiologist for further HTN management.  3. HLD: resume statin therapy as prescribed and regular f/u with Cardiologist for routine lipid monitoring and management.  Continue f/u with Cardiologist and RTO for f/u in 6 months.

## 2025-05-09 NOTE — ASSESSMENT
[FreeTextEntry1] : Assessment Acute flare of reflux esophagitis  dyspepsia  odynophagia  diverticular disease of colon   Plan High fiber diet  New prescriptions sent for famotidine and cephalexin Medications, allergies and problem list were reviewed and reconciled.

## 2025-05-09 NOTE — HISTORY OF PRESENT ILLNESS
[FreeTextEntry1] : Chief complaint: reflux esophagitis, diverticulitis of colon   HPI Patient presents for evaluation and management of multiple complex Gastrointestinal signs and symptoms. Patient is status post an episode of colonic diverticulitis, has intermittent crampy llq abdominal pain,with no exacerbating or ameliorating factors, the abdominal pain is non radiating; associated factors includes tenesmus.   Patient with acute flare of reflux esophagitis with dyspepsia, indigestion, acid reflux and epigastric pain, no exacerbating or ameliorating factors.   New prescriptions for cephalexin and famotidine were sent to the pharmacy.   Medications, allergies and problem list were reviewed and reconciled.

## 2025-05-11 NOTE — HISTORY OF PRESENT ILLNESS
[FreeTextEntry1] : 05/08/2025: Mr. CONNER is a 73-year-old male with Pmhx notable for DM2, Renal Stones, GH in 2024 (with work up finding only small LK parapelvic septated cyst);  and CaP diagnosed in 1/22 treated with radiation therapy of prostate and seminal vesicles (completed radiation on 6/21/24); and 6 mo of hormones (ADT) for his unfavorable intermediate risk disease.  Originally on A.S. w Low risk on Decipher score: 0.24   Returns today for a follow up and routine MARIAH  Pt is with c/o increasing urgency and frequency that he finds bothersome He finds himself dreading going out with dear of not finding readily available restroom    Last Eligard Injection was on 8/5/2024  -PSA undetectable on 1/3/25   Now s/p ventral hernia repair done at [facility]

## 2025-05-11 NOTE — ASSESSMENT
[FreeTextEntry1] : s/p radiation to prostate and seminal vesicles for unfavorable intermediate risk prostate cancer. Radiation completed on 6/21/24).  With known LK small parapelvic cyst    -Renewal of flomax 0.8 mg daily  -Renewal of Finasteride 5 mg po qd  #LK Septated parapelvic Renal Cyst  --MARIAH today reveals  1) Mild hydronephrosis in the left kidney.  2) Dilated right mid to lower pole calyces.  3) Again visualized, septated left lower pole cyst 1.7 x 1.6 x 1.8cm with vascularity within the septation.  Both kidneys are normal in size and echogenicity without stones or solid masses present.  -Pt will have a CT Urogram to delineate hydronephrosis vs parapelvic cyst appearing as dilation.    #LUTS and OAB  Regarding overactive bladder (OAB) (urinary urgency, frequency, and urge incontinence), we have extensively reviewed the OAB care pathway and discussed: 1st line therapy:  -Behavioral therapy -limiting fluid intake, caffeine, and artificial sweeteners; maintaining a normal weight  -Medical comorbidities - managing sleep apnea, diabetes, lower extremity edema, and CHF if present  -Conservative management options - observation, pelvic floor physical therapy 2nd line therapy:  -Medications (anticholinergics, Beta-agonists): Side effects reviewed, and patient is aware of Dry mouth, dry eye, constipation and even cognitive decline/dementia is taken long term.    -- Pt will start Tolterodine ER 2 mg one oral tablet daily. Side effects reviewed and patient is aware of Dry mouth, dry eye, constipation and even cognitive decline/dementia is taken long term.   Follow up to review CTU findings and for re-assessment on new medication

## 2025-05-11 NOTE — HISTORY OF PRESENT ILLNESS
[FreeTextEntry1] : 05/08/2025: Mr. CONNER is a 73-year-old male with Pmhx notable for DM2, Renal Stones, GH in 2024 (with work up finding only small LK parapelvic septated cyst);  and CaP diagnosed in 1/22 treated with radiation therapy of prostate and seminal vesicles (completed radiation on 6/21/24); and 6 mo of hormones (ADT) for his unfavorable intermediate risk disease.  Originally on A.S. w Low risk on Decipher score: 0.24   Returns today for a follow up and routine MARIAH  Pt is with c/o increasing urgency and frequency that he finds bothersome He finds himself dreading going out with dear of not finding readily available restroom    Last Eligard Injection was on 8/5/2024  -PSA undetectable on 1/3/25   Now s/p ventral hernia repair done at [facility]

## 2025-05-11 NOTE — ADDENDUM
[FreeTextEntry1] : This note was partly authored by Esteban Brambila working as a scribe for LYUBOV Young. I, LYUBOV Young, have reviewed the content of this note and confirm it is true and accurate. I personally performed the history and physical examination and made all the decisions. 05/08/2025.
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
## 2025-05-21 NOTE — ASSESSMENT
[FreeTextEntry1] : T2DM, Obesity, in context of CAD -Resume Ozempic 0.25mg qwk x 4 weeks then up-titrate to 0.5mg qwk sample provided -dc metformin for now -Dietary goal: 1 -2servings (15-30 grams) of Carbs per meal.   -Exercise: a brisk 10-minute walk 30-minutes after meals especially dinner -Resume Strength training-mild circuit training on resistance machines as tolerated -fax over recent bloodwork  RTC  3 months    Diabetes Counseling: The patient was counseled on diabetes foot care, long term vascular complications of diabetes, carbohydrate consistent diet, importance of diet and exercise to improve glycemic control, achieve weight loss and improve cardiovascular health, exercise/effect on glucose, hypoglycemia management, glucagon use, ketone testing, action and use of short and long-acting insulin, self-monitoring of blood glucose, insulin self-administration, injection technique, storage, and sharps disposal and sick-day management.  Patient was referred to ophthalmology for retinopathy screening. Risks and benefits of diabetic medications were discussed.
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
## 2025-07-14 NOTE — ASSESSMENT
[FreeTextEntry1] : T2DM, Obesity, in context of CAD - Ozempic 0.5mg qwk sample provided -assess need metformin  -Dietary goal: 1 -2servings (15-30 grams) of Carbs per meal.   -Exercise: a brisk 10-minute walk 30-minutes after meals especially dinner -Resume Strength training-mild circuit training on resistance machines as tolerated -Bloodwork today RTC  3 months    Diabetes Counseling: The patient was counseled on diabetes foot care, long term vascular complications of diabetes, carbohydrate consistent diet, importance of diet and exercise to improve glycemic control, achieve weight loss and improve cardiovascular health, exercise/effect on glucose, hypoglycemia management, glucagon use, ketone testing, action and use of short and long-acting insulin, self-monitoring of blood glucose, insulin self-administration, injection technique, storage, and sharps disposal and sick-day management.  Patient was referred to ophthalmology for retinopathy screening. Risks and benefits of diabetic medications were discussed.

## 2025-07-23 NOTE — VITALS
[Maximal Pain Intensity: 3/10] : 3/10 [Least Pain Intensity: 0/10] : 0/10 [Pain Location: ___] : Pain Location: [unfilled] [Pain Interferes with ADLs] : Pain interferes with activities of daily living. [OTC] : OTC [90: Able to carry normal activity; minor signs or symptoms of disease.] : 90: Able to carry normal activity; minor signs or symptoms of disease.  [ECOG Performance Status: 1 - Restricted in physically strenuous activity but ambulatory and able to carry out work of a light or sedentary nature] : Performance Status: 1 - Restricted in physically strenuous activity but ambulatory and able to carry out work of a light or sedentary nature, e.g., light house work, office work

## 2025-07-23 NOTE — DISEASE MANAGEMENT
[0-10] : 0 -10 ng/mL [Biopsy] : Patient had a biopsy on [7(3+4)] : Template Biopsy Couch Score: 7(3+4) [] : Patient had a Prostate MRI [2] : 2 [BiopsyDate] : 1/31/2022 [MeasuredProstateVolume] : 53 [TotalCores] : 4 [TotalPositiveCores] : 12 [MaxCoreInvolvement] : 80 [IIC] : IIC [Active Surveillance] : Active Surveillance [Radiation Therapy] : Migrate  from Active Surveillance  to Radiation Therapy [Treatment with radiation therapy] : Treatment with radiation therapy [EBRT] : EBRT [Treatment with androgen ablation] : Treatment with androgen ablation [LastPSADate] : 1/3/2024 [LastPSAResults] : 4.76 ng/mL [LastIDate] : 1/5/2024 [LastMRIResults] : Right posterior lateral peripheral zone lesion (PZpl) at the apex. PIRADS 4 [FirstSurveillanceBiopsyDate] : 8/8/2022 [FirstSurveillanceBiopsyResults] : see HPI for details  [2SurveillanceBiopsyDate] : 3/14/2024 [2SurveillanceBiopsyResults] : see HPI for details  [RadiationCompletedDate] : 6/21/2024 [EBRTDose] : 4000cGy [EBRTFractions] : 5 [ADTStartedDate] : 5/10/2024 [ADTDuration] :  6 months  [Pathological] : TNM Stage: p [TTNM] : 2 [NTNM] : 0 [MTNM] : 0

## 2025-07-23 NOTE — HISTORY OF PRESENT ILLNESS
[FreeTextEntry1] : Mr. Lu is a 73-year-old male who presents for a follow up visit.   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 involvement.  He was on active surveillance and then received definitive treatment in 2024 as repeat biopsy showed GS 4+3.  PSMA PET with no metastatic disease identified.    TREATMENT: 5/10/24 - received Eligard injection (22.5 mg) with Dr. Norris (urology), second injection given on 8/5/24.  6/6/24 - 6/21/24: Received RADIATION Therapy to Prostate/SV, 4000 cGy in 5 fx with ADT (6 months)  PSA Trend: 12/12/21 - 8.36 ng/mL 4/1/22 - 4.77 6/12/22 - 6.46 7/10/22 - 7.54 *started on Finasteride*  10/13/22 - 3.26 1/20/23 - 3.22 4/5/23 - 3.54 9/25/23 - 3.31 1/3/24 - 4.76  5/10/24 - 1.38           Testosterone 363.0 ng/dL *on Bicalutamide*  7/17/24 - 0.04 *post treatment*  1/3/25 - < 0.01   HPI:  12/2021 - PSA elevated to 8.36 ng/mL.  12/21/21 - MRI Prostate:  Prostate volume 53 cc. No MRI targetable prostate lesions. Diffuse, avid inflammatory type enhancement throughout the bilateral peripheral zone. PIRADS 2 Mild nodular hyperplasia of the transition zone for an overall gland volume of 53 cc. PSA density: 0.14 ng/mL/cc.   1/31/22 - underwent Prostate Biopsy: Pathology - Adenocarcinoma of Prostate in 4/12 sites, Linda score 3+4=7 in 1 site and [place] score 3+3=6 in 3 sites, perineural invasion identified in one positive core.  80% max. involvement.   Decipher Score: 0.24 (Low Risk)   2/14/22 - saw Dr. Norris (urology) in follow up .... discussed biopsy results.  Decision made for active surveillance.    8/8/22 - underwent Confirmatory Prostate Biopsy:  Pathology - Adenocarcinoma of Prostate in 3/12 sites. [place] score 3+4=7 in all 3 sites.  40% max involvement.    9/8/22 - met with Dr. Cedeño (urology surgeon) and Dr. Bray (radiation oncology) to discuss treatment options.  Mr. Lu decided to continue on active surveillance.   1/5/24 - MRI Prostate: Prostate volume 29 cc (previously 53 cc).  Lesion #1, Right, posterior lateral (PZpl), apex, peripheral zone (5 x 5 x 5 mm). Tumor abuts but does not deform capsule. No extraprostatic extension, seminal vesicle invasion, or pelvic lymphadenopathy. PIRADS 4   2/15/24 - saw Dr. Norris (urology) in follow up, discussed MRI results and concern regarding his PSA density, and the rise in PSA while on finasteride. To have biopsy.   3/14/24 - underwent Prostate Biopsy: Pathology - Adenocarcinoma of Prostate in 7/13 sites. Linda score 4+3=7 in 1 site. Linda score 3+4=7 in 5 sites (including MRI target lesion) and Linda score 3+3=6 in 1 site. 70% max. involvement.    3/25/24 - saw Dr. Cedeño (urology surgeon) in consultation ...... discussed surgical option for treatment.  Referral also made to radiation oncology for consult. PSMA PET ordered.   4/2/24 - PSMA PET: 1. PSMA avid lesions are noted in the prostate gland as detailed in full report. 2. No evidence of regional or distant PSMA avid lesions. 3. Nonspecific ill-defined groundglass opacity is noted in the lingula. Correlation with a follow-up diagnostic chest CT in 3 months to ensure stability/resolution is recommended. 4.  Skin focus is noted in the inferior right arm pit which may be infectious/inflammatory in nature. Correlation with clinical exam. 5.   Distinct tiny less than 5 mm focus is noted in the right testes' region of uncertain clinical significance differential includes inflammatory etiologies as well as contamination. Further correlation with an ultrasound may be of additional value as clinically indicated.  4/16/24 - presented in consultation for discussion of radiation therapy options.  Mr. Lu is feeling well today and looks forward to next steps in his treatment. He does have fatigue. His urinary symptoms are frequency (hourly during the day), nocturia of 1x, urgency, a bit of weak stream, no hematuria.  He is on Finasteride and Tamsulosin.  He has a history of IBS and diverticulosis; last colonoscopy was in 2023. IPSS 15 / QOL 4 / EPIC-CP 22 Discussed the results of his imaging and pathology to date. Recommended treatment, as opposed to surveillance, for his disease. He has seen Dr Cedeño to discuss surgery but there is some anxiety regarding his comorbidities. Today we discussed treating with radiation. Since he does not need pelvic radiation options include SBRT or moderate hypofractionation to the prostate. He is aware there is a brisker side effect profile associated with SBRT. SBRT would be his preference. We have discussed the use of 6 months of androgen deprivation treatment as well as the use of the SpaceOAR and fiducial markers to protect the rectum and guide daily treatment. We have also discussed the logistics and common and rare acute and late side effects from prostate radiation in detail. He had a colonoscopy in 2023. He has had written information to take away today. He has signed consent and will start bicalutamide. He will follow up with Dr Norris for ADT and will let me know when he has had his first shot.  5/10/24 - Received Eligard injection (22.5 mg) with Dr. Norris (urology)  6/6/24 - 6/21/24: Received RADIATION Therapy to Prostate/SV, 4000 cGy in 5 fx with ADT (plan for 6 months) Had increase in urinary urgency, dysuria, and some incontinence after first couple treatments. Started on Tamsulosin and given Pyridium.    7/17/24 - presented for post treatment evaluation appointment. Mr. Lu is doing well today.  His symptoms have continued to improve since completing the radiation therapy.  He still has urinary frequency, nocturia 2x, urgency, and has some discomfort "squeezing" when urinates, no burning.  Denies hematuria. Remains on Tamsulosin 2 capsules.  No bowel issues.  Poor erectile function, told by cardiologist not to use Cialis.  IPSS 18 / QOL 5 / EPIC-CP 38 He is to see urology on 8/5/24 for his next 3-month ADT injection.  PSA drawn today.   Urinary symptoms have improved. FU 6m.     8/5/24 - saw Dr. Norris (urology) in follow up. c/o slow stream, urgency/frequency (He voids about every 30 mins.), low voiding output, and feeling of incomplete voiding. Also, with occasional urge incontinence for which he uses 1 safety pad during the day. This has gotten a little bit worse since radiation. Given an antibiotic. To continue on Tamsulosin and Finasteride. Also, will try Tolterodine daily.  Follow up 6-7 weeks. Also received Eligard injection.  9/4/24 - saw Dr. Norris (urology) for cystoscopy due to complaints of onset of gross hematuria that started on 8/7/24. Cystoscopy found enlarged prostate, causing outlet obstruction. Clear b/l jets visualized. Left partially obstructed. No strictures. No tumors.  Gross hematuria likely from stone based on work-up. Hope for spontaneous passage as stone is small (only 3x4 mm). On 9/1/23 CT AP revealed Rt 3x4 mm distal ureteral stone, and mild right ureteral dilation. 9/18/24 - saw Dr. Norris (urology) in follow up. -MARIAH today shows B/l parapelvic cysts: Rt Cyst measuring 1.7cm x 1.2 cm x 1.0 cm, left cyst measuring 3.9 cm x 2.6 cm x 2.3 cm. There is a cyst with a septation seen in the lower pole of the left kidney measuring 1.8 cm x 1.7 cm x 1.7 cm with no flow. Both kidneys are normal in size and echogenicity without stones, hydronephrosis or solid masses visualized. MARIAH shows patient is with resolved Rt hydro that was evident on 9/1/23 CT AP w IV Cont. Explained to patient it's possible he may have spontaneously passed a kidney stone. Pt will have a CT Urogram to complete Genevieve hematuria work-up.  10/1/24 - CT A/P Urogram: Patient has left parapelvic renal cysts effacing a nondilated renal pelvis with infiltrative changes seen along the posterior inferior margin of the renal hilum of unclear etiology. No definable mass can be identified. No definitive evidence of uroepithelial enhancement can be seen. This is of unclear etiology and not seen on the prior CT dated 8/24 PET/CT dated 4/24. Infraumbilical midline abdominal wall hernia containing a small knuckle bowel.   10/11/24 - US Kidney: 1.7 cm cyst with thin septation in the lower pole of the left kidney.  1/31/25 - presented for follow up appointment. PSA 1/3/25 - < 0.01. Urinary frequency every hour, up at night every 2 hours. Urinary incontinence, using 2 pads per day. No bowel issues. Having ED issues, not able to take Cialis or Viagra. Will refer to Dr Valadez. Discussed pelvic floor exercises for incontinence. FU 6m. Continues to follow with Dr. Norris (urology) next appointment in April.   8/1/25 - presents for follow up visit.  He continues to follow with Dr. Norris (urology) last seen 5/2025.

## 2025-07-30 NOTE — HISTORY OF PRESENT ILLNESS
[FreeTextEntry1] : Mr. Lu is a 73-year-old male who presents for a follow up visit.   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 involvement.  He was on active surveillance and then received definitive treatment in 2024 as repeat biopsy showed GS 4+3.  PSMA PET with no metastatic disease identified.    TREATMENT: 5/10/24 - received Eligard injection (22.5 mg) with Dr. Norris (urology), second injection given on 8/5/24.  6/6/24 - 6/21/24: Received RADIATION Therapy to Prostate/SV, 4000 cGy in 5 fx with ADT (6 months)  PSA Trend: 12/12/21 - 8.36 ng/mL 4/1/22 - 4.77 6/12/22 - 6.46 7/10/22 - 7.54 *started on Finasteride*  10/13/22 - 3.26 1/20/23 - 3.22 4/5/23 - 3.54 9/25/23 - 3.31 1/3/24 - 4.76  5/10/24 - 1.38           Testosterone 363.0 ng/dL *on Bicalutamide*  7/17/24 - 0.04 *post treatment*  1/3/25 - < 0.01  7/29/25 - < 0.01  HPI:  12/2021 - PSA elevated to 8.36 ng/mL.  12/21/21 - MRI Prostate:  Prostate volume 53 cc. No MRI targetable prostate lesions. Diffuse, avid inflammatory type enhancement throughout the bilateral peripheral zone. PIRADS 2 Mild nodular hyperplasia of the transition zone for an overall gland volume of 53 cc. PSA density: 0.14 ng/mL/cc.   1/31/22 - underwent Prostate Biopsy: Pathology - Adenocarcinoma of Prostate in 4/12 sites, [place] score 3+4=7 in 1 site and [place] score 3+3=6 in 3 sites, perineural invasion identified in one positive core.  80% max. involvement.   Decipher Score: 0.24 (Low Risk)   2/14/22 - saw Dr. Norris (urology) in follow up .... discussed biopsy results.  Decision made for active surveillance.    8/8/22 - underwent Confirmatory Prostate Biopsy:  Pathology - Adenocarcinoma of Prostate in 3/12 sites. [place] score 3+4=7 in all 3 sites.  40% max involvement.    9/8/22 - met with Dr. Cedeño (urology surgeon) and Dr. Bray (radiation oncology) to discuss treatment options.  Mr. uL decided to continue on active surveillance.   1/5/24 - MRI Prostate: Prostate volume 29 cc (previously 53 cc).  Lesion #1, Right, posterior lateral (PZpl), apex, peripheral zone (5 x 5 x 5 mm). Tumor abuts but does not deform capsule. No extraprostatic extension, seminal vesicle invasion, or pelvic lymphadenopathy. PIRADS 4   2/15/24 - saw Dr. Norris (urology) in follow up, discussed MRI results and concern regarding his PSA density, and the rise in PSA while on finasteride. To have biopsy.   3/14/24 - underwent Prostate Biopsy: Pathology - Adenocarcinoma of Prostate in 7/13 sites. Linda score 4+3=7 in 1 site. Linda score 3+4=7 in 5 sites (including MRI target lesion) and Linda score 3+3=6 in 1 site. 70% max. involvement.    3/25/24 - saw Dr. Cedeño (urology surgeon) in consultation ...... discussed surgical option for treatment.  Referral also made to radiation oncology for consult. PSMA PET ordered.   4/2/24 - PSMA PET: 1. PSMA avid lesions are noted in the prostate gland as detailed in full report. 2. No evidence of regional or distant PSMA avid lesions. 3. Nonspecific ill-defined groundglass opacity is noted in the lingula. Correlation with a follow-up diagnostic chest CT in 3 months to ensure stability/resolution is recommended. 4.  Skin focus is noted in the inferior right arm pit which may be infectious/inflammatory in nature. Correlation with clinical exam. 5.   Distinct tiny less than 5 mm focus is noted in the right testes' region of uncertain clinical significance differential includes inflammatory etiologies as well as contamination. Further correlation with an ultrasound may be of additional value as clinically indicated.  4/16/24 - presented in consultation for discussion of radiation therapy options.  Mr. uL is feeling well today and looks forward to next steps in his treatment. He does have fatigue. His urinary symptoms are frequency (hourly during the day), nocturia of 1x, urgency, a bit of weak stream, no hematuria.  He is on Finasteride and Tamsulosin.  He has a history of IBS and diverticulosis; last colonoscopy was in 2023. IPSS 15 / QOL 4 / EPIC-CP 22 Discussed the results of his imaging and pathology to date. Recommended treatment, as opposed to surveillance, for his disease. He has seen Dr Cedeño to discuss surgery but there is some anxiety regarding his comorbidities. Today we discussed treating with radiation. Since he does not need pelvic radiation options include SBRT or moderate hypofractionation to the prostate. He is aware there is a brisker side effect profile associated with SBRT. SBRT would be his preference. We have discussed the use of 6 months of androgen deprivation treatment as well as the use of the SpaceOAR and fiducial markers to protect the rectum and guide daily treatment. We have also discussed the logistics and common and rare acute and late side effects from prostate radiation in detail. He had a colonoscopy in 2023. He has had written information to take away today. He has signed consent and will start bicalutamide. He will follow up with Dr Norris for ADT and will let me know when he has had his first shot.  5/10/24 - Received Eligard injection (22.5 mg) with Dr. Norris (urology)  6/6/24 - 6/21/24: Received RADIATION Therapy to Prostate/SV, 4000 cGy in 5 fx with ADT (plan for 6 months) Had increase in urinary urgency, dysuria, and some incontinence after first couple treatments. Started on Tamsulosin and given Pyridium.    7/17/24 - presented for post treatment evaluation appointment. Mr. Lu is doing well today.  His symptoms have continued to improve since completing the radiation therapy.  He still has urinary frequency, nocturia 2x, urgency, and has some discomfort "squeezing" when urinates, no burning.  Denies hematuria. Remains on Tamsulosin 2 capsules.  No bowel issues.  Poor erectile function, told by cardiologist not to use Cialis.  IPSS 18 / QOL 5 / EPIC-CP 38 He is to see urology on 8/5/24 for his next 3-month ADT injection.  PSA drawn today.   Urinary symptoms have improved. FU 6m.     8/5/24 - saw Dr. Norris (urology) in follow up. c/o slow stream, urgency/frequency (He voids about every 30 mins.), low voiding output, and feeling of incomplete voiding. Also, with occasional urge incontinence for which he uses 1 safety pad during the day. This has gotten a little bit worse since radiation. Given an antibiotic. To continue on Tamsulosin and Finasteride. Also, will try Tolterodine daily.  Follow up 6-7 weeks. Also received Eligard injection.  9/4/24 - saw Dr. Norris (urology) for cystoscopy due to complaints of onset of gross hematuria that started on 8/7/24. Cystoscopy found enlarged prostate, causing outlet obstruction. Clear b/l jets visualized. Left partially obstructed. No strictures. No tumors.  Gross hematuria likely from stone based on work-up. Hope for spontaneous passage as stone is small (only 3x4 mm). On 9/1/23 CT AP revealed Rt 3x4 mm distal ureteral stone, and mild right ureteral dilation. 9/18/24 - saw Dr. Norris (urology) in follow up. -MARIAH today shows B/l parapelvic cysts: Rt Cyst measuring 1.7cm x 1.2 cm x 1.0 cm, left cyst measuring 3.9 cm x 2.6 cm x 2.3 cm. There is a cyst with a septation seen in the lower pole of the left kidney measuring 1.8 cm x 1.7 cm x 1.7 cm with no flow. Both kidneys are normal in size and echogenicity without stones, hydronephrosis or solid masses visualized. MARIAH shows patient is with resolved Rt hydro that was evident on 9/1/23 CT AP w IV Cont. Explained to patient it's possible he may have spontaneously passed a kidney stone. Pt will have a CT Urogram to complete Genevieve hematuria work-up.  10/1/24 - CT A/P Urogram: Patient has left parapelvic renal cysts effacing a nondilated renal pelvis with infiltrative changes seen along the posterior inferior margin of the renal hilum of unclear etiology. No definable mass can be identified. No definitive evidence of uroepithelial enhancement can be seen. This is of unclear etiology and not seen on the prior CT dated 8/24 PET/CT dated 4/24. Infraumbilical midline abdominal wall hernia containing a small knuckle bowel.   10/11/24 - US Kidney: 1.7 cm cyst with thin septation in the lower pole of the left kidney.  1/31/25 - presented for follow up appointment. PSA 1/3/25 - < 0.01. Urinary frequency every hour, up at night every 2 hours. Urinary incontinence, using 2 pads per day. No bowel issues. Having ED issues, not able to take Cialis or Viagra. Will refer to Dr Valadez. Discussed pelvic floor exercises for incontinence. FU 6m. Continues to follow with Dr. Norris (urology) next appointment in April.   8/1/25 - presents for follow up visit.  He continues to follow with Dr. Norris (urology) last seen 5/2025.

## 2025-07-30 NOTE — HISTORY OF PRESENT ILLNESS
[FreeTextEntry1] : Mr. Lu is a 73-year-old male who presents for a follow up visit.   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 involvement.  He was on active surveillance and then received definitive treatment in 2024 as repeat biopsy showed GS 4+3.  PSMA PET with no metastatic disease identified.    TREATMENT: 5/10/24 - received Eligard injection (22.5 mg) with Dr. Norris (urology), second injection given on 8/5/24.  6/6/24 - 6/21/24: Received RADIATION Therapy to Prostate/SV, 4000 cGy in 5 fx with ADT (6 months)  PSA Trend: 12/12/21 - 8.36 ng/mL 4/1/22 - 4.77 6/12/22 - 6.46 7/10/22 - 7.54 *started on Finasteride*  10/13/22 - 3.26 1/20/23 - 3.22 4/5/23 - 3.54 9/25/23 - 3.31 1/3/24 - 4.76  5/10/24 - 1.38           Testosterone 363.0 ng/dL *on Bicalutamide*  7/17/24 - 0.04 *post treatment*  1/3/25 - < 0.01  7/29/25 - < 0.01  HPI:  12/2021 - PSA elevated to 8.36 ng/mL.  12/21/21 - MRI Prostate:  Prostate volume 53 cc. No MRI targetable prostate lesions. Diffuse, avid inflammatory type enhancement throughout the bilateral peripheral zone. PIRADS 2 Mild nodular hyperplasia of the transition zone for an overall gland volume of 53 cc. PSA density: 0.14 ng/mL/cc.   1/31/22 - underwent Prostate Biopsy: Pathology - Adenocarcinoma of Prostate in 4/12 sites, Hartley score 3+4=7 in 1 site and Hartley score 3+3=6 in 3 sites, perineural invasion identified in one positive core.  80% max. involvement.   Decipher Score: 0.24 (Low Risk)   2/14/22 - saw Dr. Norris (urology) in follow up .... discussed biopsy results.  Decision made for active surveillance.    8/8/22 - underwent Confirmatory Prostate Biopsy:  Pathology - Adenocarcinoma of Prostate in 3/12 sites. Hartley score 3+4=7 in all 3 sites.  40% max involvement.    9/8/22 - met with Dr. Cedeño (urology surgeon) and Dr. Bray (radiation oncology) to discuss treatment options.  Mr. Lu decided to continue on active surveillance.   1/5/24 - MRI Prostate: Prostate volume 29 cc (previously 53 cc).  Lesion #1, Right, posterior lateral (PZpl), apex, peripheral zone (5 x 5 x 5 mm). Tumor abuts but does not deform capsule. No extraprostatic extension, seminal vesicle invasion, or pelvic lymphadenopathy. PIRADS 4   2/15/24 - saw Dr. Norris (urology) in follow up, discussed MRI results and concern regarding his PSA density, and the rise in PSA while on finasteride. To have biopsy.   3/14/24 - underwent Prostate Biopsy: Pathology - Adenocarcinoma of Prostate in 7/13 sites. Linda score 4+3=7 in 1 site. Linda score 3+4=7 in 5 sites (including MRI target lesion) and Linda score 3+3=6 in 1 site. 70% max. involvement.    3/25/24 - saw Dr. Cedeño (urology surgeon) in consultation ...... discussed surgical option for treatment.  Referral also made to radiation oncology for consult. PSMA PET ordered.   4/2/24 - PSMA PET: 1. PSMA avid lesions are noted in the prostate gland as detailed in full report. 2. No evidence of regional or distant PSMA avid lesions. 3. Nonspecific ill-defined groundglass opacity is noted in the lingula. Correlation with a follow-up diagnostic chest CT in 3 months to ensure stability/resolution is recommended. 4.  Skin focus is noted in the inferior right arm pit which may be infectious/inflammatory in nature. Correlation with clinical exam. 5.   Distinct tiny less than 5 mm focus is noted in the right testes' region of uncertain clinical significance differential includes inflammatory etiologies as well as contamination. Further correlation with an ultrasound may be of additional value as clinically indicated.  4/16/24 - presented in consultation for discussion of radiation therapy options.  Mr. Lu is feeling well today and looks forward to next steps in his treatment. He does have fatigue. His urinary symptoms are frequency (hourly during the day), nocturia of 1x, urgency, a bit of weak stream, no hematuria.  He is on Finasteride and Tamsulosin.  He has a history of IBS and diverticulosis; last colonoscopy was in 2023. IPSS 15 / QOL 4 / EPIC-CP 22 Discussed the results of his imaging and pathology to date. Recommended treatment, as opposed to surveillance, for his disease. He has seen Dr Cedeño to discuss surgery but there is some anxiety regarding his comorbidities. Today we discussed treating with radiation. Since he does not need pelvic radiation options include SBRT or moderate hypofractionation to the prostate. He is aware there is a brisker side effect profile associated with SBRT. SBRT would be his preference. We have discussed the use of 6 months of androgen deprivation treatment as well as the use of the SpaceOAR and fiducial markers to protect the rectum and guide daily treatment. We have also discussed the logistics and common and rare acute and late side effects from prostate radiation in detail. He had a colonoscopy in 2023. He has had written information to take away today. He has signed consent and will start bicalutamide. He will follow up with Dr Norris for ADT and will let me know when he has had his first shot.  5/10/24 - Received Eligard injection (22.5 mg) with Dr. Norris (urology)  6/6/24 - 6/21/24: Received RADIATION Therapy to Prostate/SV, 4000 cGy in 5 fx with ADT (plan for 6 months) Had increase in urinary urgency, dysuria, and some incontinence after first couple treatments. Started on Tamsulosin and given Pyridium.    7/17/24 - presented for post treatment evaluation appointment. Mr. Lu is doing well today.  His symptoms have continued to improve since completing the radiation therapy.  He still has urinary frequency, nocturia 2x, urgency, and has some discomfort "squeezing" when urinates, no burning.  Denies hematuria. Remains on Tamsulosin 2 capsules.  No bowel issues.  Poor erectile function, told by cardiologist not to use Cialis.  IPSS 18 / QOL 5 / EPIC-CP 38 He is to see urology on 8/5/24 for his next 3-month ADT injection.  PSA drawn today.   Urinary symptoms have improved. FU 6m.     8/5/24 - saw Dr. Norris (urology) in follow up. c/o slow stream, urgency/frequency (He voids about every 30 mins.), low voiding output, and feeling of incomplete voiding. Also, with occasional urge incontinence for which he uses 1 safety pad during the day. This has gotten a little bit worse since radiation. Given an antibiotic. To continue on Tamsulosin and Finasteride. Also, will try Tolterodine daily.  Follow up 6-7 weeks. Also received Eligard injection.  9/4/24 - saw Dr. Norris (urology) for cystoscopy due to complaints of onset of gross hematuria that started on 8/7/24. Cystoscopy found enlarged prostate, causing outlet obstruction. Clear b/l jets visualized. Left partially obstructed. No strictures. No tumors.  Gross hematuria likely from stone based on work-up. Hope for spontaneous passage as stone is small (only 3x4 mm). On 9/1/23 CT AP revealed Rt 3x4 mm distal ureteral stone, and mild right ureteral dilation. 9/18/24 - saw Dr. Norris (urology) in follow up. -MARIAH today shows B/l parapelvic cysts: Rt Cyst measuring 1.7cm x 1.2 cm x 1.0 cm, left cyst measuring 3.9 cm x 2.6 cm x 2.3 cm. There is a cyst with a septation seen in the lower pole of the left kidney measuring 1.8 cm x 1.7 cm x 1.7 cm with no flow. Both kidneys are normal in size and echogenicity without stones, hydronephrosis or solid masses visualized. MARIAH shows patient is with resolved Rt hydro that was evident on 9/1/23 CT AP w IV Cont. Explained to patient it's possible he may have spontaneously passed a kidney stone. Pt will have a CT Urogram to complete Genevieve hematuria work-up.  10/1/24 - CT A/P Urogram: Patient has left parapelvic renal cysts effacing a nondilated renal pelvis with infiltrative changes seen along the posterior inferior margin of the renal hilum of unclear etiology. No definable mass can be identified. No definitive evidence of uroepithelial enhancement can be seen. This is of unclear etiology and not seen on the prior CT dated 8/24 PET/CT dated 4/24. Infraumbilical midline abdominal wall hernia containing a small knuckle bowel.   10/11/24 - US Kidney: 1.7 cm cyst with thin septation in the lower pole of the left kidney.  1/31/25 - presented for follow up appointment. PSA 1/3/25 - < 0.01. Urinary frequency every hour, up at night every 2 hours. Urinary incontinence, using 2 pads per day. No bowel issues. Having ED issues, not able to take Cialis or Viagra. Will refer to Dr Valadez. Discussed pelvic floor exercises for incontinence. FU 6m. Continues to follow with Dr. Norris (urology) next appointment in April.   8/1/25 - presents for follow up visit.  He continues to follow with Dr. Norris (urology) last seen 5/2025.

## 2025-07-30 NOTE — DISEASE MANAGEMENT
[BiopsyDate] : 1/31/2022 [MeasuredProstateVolume] : 53 [TotalCores] : 4 [TotalPositiveCores] : 12 [MaxCoreInvolvement] : 80 [LastPSADate] : 1/3/2024 [LastPSAResults] : 4.76 ng/mL [LastIDate] : 1/5/2024 [LastMRIResults] : Right posterior lateral peripheral zone lesion (PZpl) at the apex. PIRADS 4 [FirstSurveillanceBiopsyDate] : 8/8/2022 [FirstSurveillanceBiopsyResults] : see HPI for details  [2SurveillanceBiopsyDate] : 3/14/2024 [2SurveillanceBiopsyResults] : see HPI for details  [RadiationCompletedDate] : 6/21/2024 [EBRTDose] : 4000cGy [EBRTFractions] : 5 [ADTStartedDate] : 5/10/2024 [ADTDuration] :  6 months  [TTNM] : 2 [NTNM] : 0 [MTNM] : 0

## 2025-07-30 NOTE — HISTORY OF PRESENT ILLNESS
[FreeTextEntry1] : Mr. Lu is a 73-year-old male who presents for a follow up visit.   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 involvement.  He was on active surveillance and then received definitive treatment in 2024 as repeat biopsy showed GS 4+3.  PSMA PET with no metastatic disease identified.    TREATMENT: 5/10/24 - received Eligard injection (22.5 mg) with Dr. Norris (urology), second injection given on 8/5/24.  6/6/24 - 6/21/24: Received RADIATION Therapy to Prostate/SV, 4000 cGy in 5 fx with ADT (6 months)  PSA Trend: 12/12/21 - 8.36 ng/mL 4/1/22 - 4.77 6/12/22 - 6.46 7/10/22 - 7.54 *started on Finasteride*  10/13/22 - 3.26 1/20/23 - 3.22 4/5/23 - 3.54 9/25/23 - 3.31 1/3/24 - 4.76  5/10/24 - 1.38           Testosterone 363.0 ng/dL *on Bicalutamide*  7/17/24 - 0.04 *post treatment*  1/3/25 - < 0.01  7/29/25 - < 0.01  HPI:  12/2021 - PSA elevated to 8.36 ng/mL.  12/21/21 - MRI Prostate:  Prostate volume 53 cc. No MRI targetable prostate lesions. Diffuse, avid inflammatory type enhancement throughout the bilateral peripheral zone. PIRADS 2 Mild nodular hyperplasia of the transition zone for an overall gland volume of 53 cc. PSA density: 0.14 ng/mL/cc.   1/31/22 - underwent Prostate Biopsy: Pathology - Adenocarcinoma of Prostate in 4/12 sites, Penfield score 3+4=7 in 1 site and Penfield score 3+3=6 in 3 sites, perineural invasion identified in one positive core.  80% max. involvement.   Decipher Score: 0.24 (Low Risk)   2/14/22 - saw Dr. Norris (urology) in follow up .... discussed biopsy results.  Decision made for active surveillance.    8/8/22 - underwent Confirmatory Prostate Biopsy:  Pathology - Adenocarcinoma of Prostate in 3/12 sites. Penfield score 3+4=7 in all 3 sites.  40% max involvement.    9/8/22 - met with Dr. Cedeño (urology surgeon) and Dr. Bray (radiation oncology) to discuss treatment options.  Mr. Lu decided to continue on active surveillance.   1/5/24 - MRI Prostate: Prostate volume 29 cc (previously 53 cc).  Lesion #1, Right, posterior lateral (PZpl), apex, peripheral zone (5 x 5 x 5 mm). Tumor abuts but does not deform capsule. No extraprostatic extension, seminal vesicle invasion, or pelvic lymphadenopathy. PIRADS 4   2/15/24 - saw Dr. Norris (urology) in follow up, discussed MRI results and concern regarding his PSA density, and the rise in PSA while on finasteride. To have biopsy.   3/14/24 - underwent Prostate Biopsy: Pathology - Adenocarcinoma of Prostate in 7/13 sites. Linda score 4+3=7 in 1 site. Linda score 3+4=7 in 5 sites (including MRI target lesion) and Linda score 3+3=6 in 1 site. 70% max. involvement.    3/25/24 - saw Dr. Cedeño (urology surgeon) in consultation ...... discussed surgical option for treatment.  Referral also made to radiation oncology for consult. PSMA PET ordered.   4/2/24 - PSMA PET: 1. PSMA avid lesions are noted in the prostate gland as detailed in full report. 2. No evidence of regional or distant PSMA avid lesions. 3. Nonspecific ill-defined groundglass opacity is noted in the lingula. Correlation with a follow-up diagnostic chest CT in 3 months to ensure stability/resolution is recommended. 4.  Skin focus is noted in the inferior right arm pit which may be infectious/inflammatory in nature. Correlation with clinical exam. 5.   Distinct tiny less than 5 mm focus is noted in the right testes' region of uncertain clinical significance differential includes inflammatory etiologies as well as contamination. Further correlation with an ultrasound may be of additional value as clinically indicated.  4/16/24 - presented in consultation for discussion of radiation therapy options.  Mr. Lu is feeling well today and looks forward to next steps in his treatment. He does have fatigue. His urinary symptoms are frequency (hourly during the day), nocturia of 1x, urgency, a bit of weak stream, no hematuria.  He is on Finasteride and Tamsulosin.  He has a history of IBS and diverticulosis; last colonoscopy was in 2023. IPSS 15 / QOL 4 / EPIC-CP 22 Discussed the results of his imaging and pathology to date. Recommended treatment, as opposed to surveillance, for his disease. He has seen Dr Cedeño to discuss surgery but there is some anxiety regarding his comorbidities. Today we discussed treating with radiation. Since he does not need pelvic radiation options include SBRT or moderate hypofractionation to the prostate. He is aware there is a brisker side effect profile associated with SBRT. SBRT would be his preference. We have discussed the use of 6 months of androgen deprivation treatment as well as the use of the SpaceOAR and fiducial markers to protect the rectum and guide daily treatment. We have also discussed the logistics and common and rare acute and late side effects from prostate radiation in detail. He had a colonoscopy in 2023. He has had written information to take away today. He has signed consent and will start bicalutamide. He will follow up with Dr Norris for ADT and will let me know when he has had his first shot.  5/10/24 - Received Eligard injection (22.5 mg) with Dr. Norris (urology)  6/6/24 - 6/21/24: Received RADIATION Therapy to Prostate/SV, 4000 cGy in 5 fx with ADT (plan for 6 months) Had increase in urinary urgency, dysuria, and some incontinence after first couple treatments. Started on Tamsulosin and given Pyridium.    7/17/24 - presented for post treatment evaluation appointment. Mr. Lu is doing well today.  His symptoms have continued to improve since completing the radiation therapy.  He still has urinary frequency, nocturia 2x, urgency, and has some discomfort "squeezing" when urinates, no burning.  Denies hematuria. Remains on Tamsulosin 2 capsules.  No bowel issues.  Poor erectile function, told by cardiologist not to use Cialis.  IPSS 18 / QOL 5 / EPIC-CP 38 He is to see urology on 8/5/24 for his next 3-month ADT injection.  PSA drawn today.   Urinary symptoms have improved. FU 6m.     8/5/24 - saw Dr. Norris (urology) in follow up. c/o slow stream, urgency/frequency (He voids about every 30 mins.), low voiding output, and feeling of incomplete voiding. Also, with occasional urge incontinence for which he uses 1 safety pad during the day. This has gotten a little bit worse since radiation. Given an antibiotic. To continue on Tamsulosin and Finasteride. Also, will try Tolterodine daily.  Follow up 6-7 weeks. Also received Eligard injection.  9/4/24 - saw Dr. Norris (urology) for cystoscopy due to complaints of onset of gross hematuria that started on 8/7/24. Cystoscopy found enlarged prostate, causing outlet obstruction. Clear b/l jets visualized. Left partially obstructed. No strictures. No tumors.  Gross hematuria likely from stone based on work-up. Hope for spontaneous passage as stone is small (only 3x4 mm). On 9/1/23 CT AP revealed Rt 3x4 mm distal ureteral stone, and mild right ureteral dilation. 9/18/24 - saw Dr. Norris (urology) in follow up. -MARIAH today shows B/l parapelvic cysts: Rt Cyst measuring 1.7cm x 1.2 cm x 1.0 cm, left cyst measuring 3.9 cm x 2.6 cm x 2.3 cm. There is a cyst with a septation seen in the lower pole of the left kidney measuring 1.8 cm x 1.7 cm x 1.7 cm with no flow. Both kidneys are normal in size and echogenicity without stones, hydronephrosis or solid masses visualized. MARIAH shows patient is with resolved Rt hydro that was evident on 9/1/23 CT AP w IV Cont. Explained to patient it's possible he may have spontaneously passed a kidney stone. Pt will have a CT Urogram to complete Genevieve hematuria work-up.  10/1/24 - CT A/P Urogram: Patient has left parapelvic renal cysts effacing a nondilated renal pelvis with infiltrative changes seen along the posterior inferior margin of the renal hilum of unclear etiology. No definable mass can be identified. No definitive evidence of uroepithelial enhancement can be seen. This is of unclear etiology and not seen on the prior CT dated 8/24 PET/CT dated 4/24. Infraumbilical midline abdominal wall hernia containing a small knuckle bowel.   10/11/24 - US Kidney: 1.7 cm cyst with thin septation in the lower pole of the left kidney.  1/31/25 - presented for follow up appointment. PSA 1/3/25 - < 0.01. Urinary frequency every hour, up at night every 2 hours. Urinary incontinence, using 2 pads per day. No bowel issues. Having ED issues, not able to take Cialis or Viagra. Will refer to Dr Valadez. Discussed pelvic floor exercises for incontinence. FU 6m. Continues to follow with Dr. Norris (urology) next appointment in April.   8/1/25 - presents for follow up visit.  He continues to follow with Dr. Norris (urology) last seen 5/2025.